# Patient Record
Sex: FEMALE | Race: BLACK OR AFRICAN AMERICAN | NOT HISPANIC OR LATINO | URBAN - METROPOLITAN AREA
[De-identification: names, ages, dates, MRNs, and addresses within clinical notes are randomized per-mention and may not be internally consistent; named-entity substitution may affect disease eponyms.]

---

## 2020-03-03 ENCOUNTER — EMERGENCY (EMERGENCY)
Facility: HOSPITAL | Age: 20
LOS: 1 days | Discharge: HOME | End: 2020-03-03
Attending: EMERGENCY MEDICINE | Admitting: EMERGENCY MEDICINE
Payer: MEDICAID

## 2020-03-03 VITALS
OXYGEN SATURATION: 100 % | DIASTOLIC BLOOD PRESSURE: 61 MMHG | TEMPERATURE: 99 F | SYSTOLIC BLOOD PRESSURE: 109 MMHG | HEART RATE: 84 BPM | RESPIRATION RATE: 16 BRPM

## 2020-03-03 VITALS
RESPIRATION RATE: 18 BRPM | TEMPERATURE: 98 F | OXYGEN SATURATION: 100 % | HEART RATE: 80 BPM | DIASTOLIC BLOOD PRESSURE: 78 MMHG | SYSTOLIC BLOOD PRESSURE: 113 MMHG

## 2020-03-03 LAB
ALBUMIN SERPL ELPH-MCNC: 4.5 G/DL — SIGNIFICANT CHANGE UP (ref 3.3–5)
ALP SERPL-CCNC: 50 U/L — SIGNIFICANT CHANGE UP (ref 40–120)
ALT FLD-CCNC: 30 U/L — SIGNIFICANT CHANGE UP (ref 4–33)
ANION GAP SERPL CALC-SCNC: 16 MMO/L — HIGH (ref 7–14)
APPEARANCE UR: CLEAR — SIGNIFICANT CHANGE UP
AST SERPL-CCNC: 36 U/L — HIGH (ref 4–32)
BACTERIA # UR AUTO: SIGNIFICANT CHANGE UP
BASOPHILS # BLD AUTO: 0.02 K/UL — SIGNIFICANT CHANGE UP (ref 0–0.2)
BASOPHILS NFR BLD AUTO: 0.2 % — SIGNIFICANT CHANGE UP (ref 0–2)
BILIRUB SERPL-MCNC: 0.5 MG/DL — SIGNIFICANT CHANGE UP (ref 0.2–1.2)
BILIRUB UR-MCNC: NEGATIVE — SIGNIFICANT CHANGE UP
BLOOD UR QL VISUAL: SIGNIFICANT CHANGE UP
BUN SERPL-MCNC: 9 MG/DL — SIGNIFICANT CHANGE UP (ref 7–23)
CALCIUM SERPL-MCNC: 9.1 MG/DL — SIGNIFICANT CHANGE UP (ref 8.4–10.5)
CHLORIDE SERPL-SCNC: 95 MMOL/L — LOW (ref 98–107)
CO2 SERPL-SCNC: 22 MMOL/L — SIGNIFICANT CHANGE UP (ref 22–31)
COLOR SPEC: YELLOW — SIGNIFICANT CHANGE UP
CREAT SERPL-MCNC: 0.65 MG/DL — SIGNIFICANT CHANGE UP (ref 0.5–1.3)
EOSINOPHIL # BLD AUTO: 0.01 K/UL — SIGNIFICANT CHANGE UP (ref 0–0.5)
EOSINOPHIL NFR BLD AUTO: 0.1 % — SIGNIFICANT CHANGE UP (ref 0–6)
GLUCOSE SERPL-MCNC: 90 MG/DL — SIGNIFICANT CHANGE UP (ref 70–99)
GLUCOSE UR-MCNC: NEGATIVE — SIGNIFICANT CHANGE UP
HCT VFR BLD CALC: 36.5 % — SIGNIFICANT CHANGE UP (ref 34.5–45)
HGB BLD-MCNC: 12.4 G/DL — SIGNIFICANT CHANGE UP (ref 11.5–15.5)
HYALINE CASTS # UR AUTO: HIGH
IMM GRANULOCYTES NFR BLD AUTO: 0.2 % — SIGNIFICANT CHANGE UP (ref 0–1.5)
KETONES UR-MCNC: >150 — HIGH
LEUKOCYTE ESTERASE UR-ACNC: NEGATIVE — SIGNIFICANT CHANGE UP
LYMPHOCYTES # BLD AUTO: 1.78 K/UL — SIGNIFICANT CHANGE UP (ref 1–3.3)
LYMPHOCYTES # BLD AUTO: 19.3 % — SIGNIFICANT CHANGE UP (ref 13–44)
MAGNESIUM SERPL-MCNC: 2.1 MG/DL — SIGNIFICANT CHANGE UP (ref 1.6–2.6)
MCHC RBC-ENTMCNC: 30.8 PG — SIGNIFICANT CHANGE UP (ref 27–34)
MCHC RBC-ENTMCNC: 34 % — SIGNIFICANT CHANGE UP (ref 32–36)
MCV RBC AUTO: 90.8 FL — SIGNIFICANT CHANGE UP (ref 80–100)
MONOCYTES # BLD AUTO: 0.63 K/UL — SIGNIFICANT CHANGE UP (ref 0–0.9)
MONOCYTES NFR BLD AUTO: 6.8 % — SIGNIFICANT CHANGE UP (ref 2–14)
NEUTROPHILS # BLD AUTO: 6.74 K/UL — SIGNIFICANT CHANGE UP (ref 1.8–7.4)
NEUTROPHILS NFR BLD AUTO: 73.4 % — SIGNIFICANT CHANGE UP (ref 43–77)
NITRITE UR-MCNC: NEGATIVE — SIGNIFICANT CHANGE UP
NRBC # FLD: 0 K/UL — SIGNIFICANT CHANGE UP (ref 0–0)
PH UR: 6 — SIGNIFICANT CHANGE UP (ref 5–8)
PHOSPHATE SERPL-MCNC: 4.1 MG/DL — SIGNIFICANT CHANGE UP (ref 2.5–4.5)
PLATELET # BLD AUTO: 315 K/UL — SIGNIFICANT CHANGE UP (ref 150–400)
PMV BLD: 10.4 FL — SIGNIFICANT CHANGE UP (ref 7–13)
POTASSIUM SERPL-MCNC: 3.6 MMOL/L — SIGNIFICANT CHANGE UP (ref 3.5–5.3)
POTASSIUM SERPL-SCNC: 3.6 MMOL/L — SIGNIFICANT CHANGE UP (ref 3.5–5.3)
PROT SERPL-MCNC: 8.5 G/DL — HIGH (ref 6–8.3)
PROT UR-MCNC: 200 — HIGH
RBC # BLD: 4.02 M/UL — SIGNIFICANT CHANGE UP (ref 3.8–5.2)
RBC # FLD: 11.9 % — SIGNIFICANT CHANGE UP (ref 10.3–14.5)
RBC CASTS # UR COMP ASSIST: SIGNIFICANT CHANGE UP (ref 0–?)
SODIUM SERPL-SCNC: 133 MMOL/L — LOW (ref 135–145)
SP GR SPEC: 1.04 — SIGNIFICANT CHANGE UP (ref 1–1.04)
SPECIMEN SOURCE: SIGNIFICANT CHANGE UP
SQUAMOUS # UR AUTO: SIGNIFICANT CHANGE UP
UROBILINOGEN FLD QL: SIGNIFICANT CHANGE UP
WBC # BLD: 9.2 K/UL — SIGNIFICANT CHANGE UP (ref 3.8–10.5)
WBC # FLD AUTO: 9.2 K/UL — SIGNIFICANT CHANGE UP (ref 3.8–10.5)
WBC UR QL: HIGH (ref 0–?)

## 2020-03-03 PROCEDURE — 76705 ECHO EXAM OF ABDOMEN: CPT | Mod: 26

## 2020-03-03 PROCEDURE — 99284 EMERGENCY DEPT VISIT MOD MDM: CPT

## 2020-03-03 RX ORDER — FAMOTIDINE 10 MG/ML
20 INJECTION INTRAVENOUS ONCE
Refills: 0 | Status: COMPLETED | OUTPATIENT
Start: 2020-03-03 | End: 2020-03-03

## 2020-03-03 RX ORDER — ONDANSETRON 8 MG/1
1 TABLET, FILM COATED ORAL
Qty: 9 | Refills: 0
Start: 2020-03-03 | End: 2020-03-05

## 2020-03-03 RX ORDER — SODIUM CHLORIDE 9 MG/ML
1000 INJECTION INTRAMUSCULAR; INTRAVENOUS; SUBCUTANEOUS ONCE
Refills: 0 | Status: COMPLETED | OUTPATIENT
Start: 2020-03-03 | End: 2020-03-03

## 2020-03-03 RX ORDER — ONDANSETRON 8 MG/1
4 TABLET, FILM COATED ORAL ONCE
Refills: 0 | Status: COMPLETED | OUTPATIENT
Start: 2020-03-03 | End: 2020-03-03

## 2020-03-03 RX ADMIN — ONDANSETRON 4 MILLIGRAM(S): 8 TABLET, FILM COATED ORAL at 04:38

## 2020-03-03 RX ADMIN — Medication 30 MILLILITER(S): at 04:45

## 2020-03-03 RX ADMIN — FAMOTIDINE 20 MILLIGRAM(S): 10 INJECTION INTRAVENOUS at 04:45

## 2020-03-03 RX ADMIN — SODIUM CHLORIDE 1000 MILLILITER(S): 9 INJECTION INTRAMUSCULAR; INTRAVENOUS; SUBCUTANEOUS at 04:38

## 2020-03-03 NOTE — ED PROVIDER NOTE - PATIENT PORTAL LINK FT
You can access the FollowMyHealth Patient Portal offered by [facility] by registering at the following website: [email or web address]. By joining [facility]’s FollowMyHealth portal, you will also be able to view your health information using other applications (apps) compatible with our system.

## 2020-03-03 NOTE — ED PROVIDER NOTE - ATTENDING CONTRIBUTION TO CARE
19F G1 EGA 11wk (confirmed IUP at her OB, hx low thyroid now p/w 5d worsening n/v w/ nonradiating atraumatic epigastric pain - pt unsure which caused which.  +5lb weight loss over past week. ROS +cough. No f/c, CP, SOB, dysuria, constipation. No vaginal bleeding or leakage of fluid.     VS: afebrile, others reassuring   Gen: Well appearing in NAD  Head: NC/AT  HEENT: dry mucous membranes   Neck: trachea midline  Resp:  No distress  abd: soft, +epigastric & mild RUQ ttp w/o guarding   Ext: no deformities  Neuro:  A&Ox3  Skin:  Warm and dry as visualized  Psych:  appropriate affect and mood    MDM: likely hyperenmesis but could be c/w cholelethiasis/cholecystitis   Plan: 1) IV access/IVF/LABS/UA/US RUQ.  2) Pain control as needed/anti-emetics as needed.  3) confirm FHR.  4) Surgical consultation if necessary

## 2020-03-03 NOTE — ED ADULT NURSE NOTE - CHIEF COMPLAINT QUOTE
Patient reports about 11 weeks pregnant c/o unable to tolerate PO intake x1 week. Patient reports abdominal burning and 5lb weight loss within 1 week. Patient denies any vaginal bleeding or cramping. LMP 12/12.

## 2020-03-03 NOTE — ED PROVIDER NOTE - NS ED ROS FT
General: Denies fevers or chills  Eyes: Denies vision changes  ENMT: Denies trouble swallowing or speaking, or sore throat  CV: Denies chest pain or palpitations  Resp: Denies cough or SOB  GI: +Abdominal pain, nausea, vomiting. No diarrhea, or constipation   : +Intermittent pelvic cramps. Denies painful urination, increased urinary frequency, or blood in urine  Skin: Denies new rashes  Neuro: Denies headache, numbness, or weakness  MSK: Denies recent trauma

## 2020-03-03 NOTE — ED PROVIDER NOTE - OBJECTIVE STATEMENT
19F 11wk pregnant confirmed IUP PMH hypothyroidism p/w burning epigastric pain with n/v x 5 days. +5lb weight loss over past week. OB Dr. Kaur Abraham planned for IV hydration/antiemetics tomorrow but pt's sx have been worsening. Associated with generalized weakness, lightheadedness. Also reports productive cough. No f/c, CP, SOB, dysuria, constipation. +A few episodes of diarrhea. No vaginal bleeding or leakage of fluid. No change in her baseline occasional pelvic cramps, which have been evaluated by her OB.

## 2020-03-03 NOTE — ED PROVIDER NOTE - PHYSICAL EXAMINATION
I have reviewed the triage vital signs.  Const: AAOx3, in NAD  Eyes: no conjunctival injection  HENT: NCAT, Neck supple, oral mucosa moist  CV: RRR, +S1, S2  Resp: CTAB, no respiratory distress  GI: Abdomen soft, nondistended, +epigastric/RUQ TTP, no guarding, no CVA tenderness  Extremities: No peripheral edema,  2+ radial and DP pulses  Skin: Warm, well perfused, no rash  MSK: No gross deformities appreciated  Neuro: No focal sensory or motor deficits  Psych: Appropriate mood and affect

## 2020-03-03 NOTE — ED PROVIDER NOTE - PROGRESS NOTE DETAILS
Zvi Dubin, MD note: Pt seen & reassessed.  Pt symptomatically improved.  Emergency Department FRH = 176.  A&Ox3, NAD, VSS, pt tolerated PO liquids & ambulated w/steady unassisted gait in the ED.  We discussed the results of ED w/u w/patient (incl. presumptive Emergency Department dx, associated anticipatory guidance, stressing importance of prompt f/u, return precautions), & gave them a copy of results.  Patient verbalized understanding of ED course & agreed with our f/u recommendations, has decisional making capacity.  Pt st they will f/u w/PMD within the next 3 days; pt agrees to call today or tomorrow for an appointment. Pt agrees to return to the ED if there is any worsening or concerning symptoms.

## 2020-03-03 NOTE — ED PROVIDER NOTE - CLINICAL SUMMARY MEDICAL DECISION MAKING FREE TEXT BOX
Robbi, PGY1 - 19F 11wk pregnant confirmed IUP p/w epigastric pain with n/v x 5 days. Likely hyperemesis gravidarum. Will evaluate for gallbladder pathology given +RUQ TTP. Plan: labs, ivf, zofran.

## 2020-03-03 NOTE — ED ADULT NURSE NOTE - OBJECTIVE STATEMENT
Received Pt in room 24. pt A&OX3, ambulatory, mother at bedside. Patient reports about 11 weeks pregnant c/o unable to tolerate PO intake x1 week. Patient reports abdominal burning and 5lb weight loss within 1 week. Pt also reports intermittent abdominal pain. LMP 12/12. Pt appears stable and in no apparent distress at this time. Vitals stable as noted. Respirations are equal and nonlabored, no respiratory distress noted. sating 100% on room air. Abdomen soft, tender to touch in upper quadrants. Denies any vaginal bleeding or cramping, chest pain, sob, fever/chills, cough, dizziness, headache, urinary complications. 20 gauge iv placed in the right ac, labs sent. pt medicated as per orders. iv fluids infusing. Pt stable, awaiting ultrasound

## 2020-03-03 NOTE — ED PROVIDER NOTE - NSFOLLOWUPINSTRUCTIONS_ED_ALL_ED_FT
-- We have sent a prescription for zofran directly to the pharmacy you specified.  Please pick it up as soon as possible and take as directed.  We advise to refrain from consuming alcohol or grapefruit juice while taking any medication until checking with the doctor or pharmacist.   -- Please follow up with your OBGYN within the next 3 days, but seek medical care sooner if your symptoms worsen. Call for an appointment as soon as possible.  -- Make sure to stay hydrated. This can be most easily done by drinking frequent small amounts of gatorade or watered down apple/orange juice. It's OK if you do not eat anything for a few days as long as you stay hydrated.  -- Please eat a bland diet (toast, jam, tea, bananas, plain rice, applesauce, etc.) until your symptoms have resolved. Avoid fatty/greasy/spicy foods, alcohol or anything that bothers your stomach.   -- You were given results from any tests performed in the Emergency Department which have results available. Show these to your doctor(s). Some of the tests we sent may not have results yet so please call or have your doctor call the Emergency Department to follow up on all results.  -- If you have worsening or concerning symptoms, see your doctor right away or return to the Emergency Department immediately.  -- Please continue taking your home medications as directed. Do not take extra doses of medication to make up for missed doses. Don't use alcohol when taking any medication (especially antibiotics, Tylenol or pain medication) unless you check with a doctor/pharmacist.

## 2020-03-04 LAB — BACTERIA UR CULT: SIGNIFICANT CHANGE UP

## 2020-03-07 NOTE — ED POST DISCHARGE NOTE - RESULT SUMMARY
UCX: Culture grew 3 or more types of organisms which indicate collection contamination; consider recollection only if clinically indicated. No antibiotic noted. Pt is 11 weeks pregnant

## 2020-07-30 ENCOUNTER — OUTPATIENT (OUTPATIENT)
Dept: INPATIENT UNIT | Facility: HOSPITAL | Age: 20
LOS: 1 days | Discharge: HOME | End: 2020-07-30
Payer: MEDICAID

## 2020-07-30 VITALS — TEMPERATURE: 98 F

## 2020-07-30 DIAGNOSIS — Z3A.00 WEEKS OF GESTATION OF PREGNANCY NOT SPECIFIED: ICD-10-CM

## 2020-07-30 DIAGNOSIS — O26.899 OTHER SPECIFIED PREGNANCY RELATED CONDITIONS, UNSPECIFIED TRIMESTER: ICD-10-CM

## 2020-07-30 PROCEDURE — 99203 OFFICE O/P NEW LOW 30 MIN: CPT

## 2020-07-30 PROCEDURE — 76818 FETAL BIOPHYS PROFILE W/NST: CPT | Mod: 26

## 2020-07-30 RX ORDER — LEVOTHYROXINE SODIUM 125 MCG
1 TABLET ORAL
Qty: 0 | Refills: 0 | DISCHARGE

## 2020-07-30 RX ORDER — ACETAMINOPHEN 500 MG
975 TABLET ORAL ONCE
Refills: 0 | Status: COMPLETED | OUTPATIENT
Start: 2020-07-30 | End: 2020-07-30

## 2020-07-30 RX ADMIN — Medication 975 MILLIGRAM(S): at 23:29

## 2020-07-30 NOTE — OB RN TRIAGE NOTE - CHIEF COMPLAINT QUOTE
Pt. was referred from the doctor's office to r/o PEC due to swelling of the lower extremities , headache and blurry vision .

## 2020-07-31 VITALS — SYSTOLIC BLOOD PRESSURE: 85 MMHG | DIASTOLIC BLOOD PRESSURE: 47 MMHG | HEART RATE: 70 BPM

## 2020-07-31 PROBLEM — E03.9 HYPOTHYROIDISM, UNSPECIFIED: Chronic | Status: ACTIVE | Noted: 2020-03-03

## 2020-07-31 LAB
ALBUMIN SERPL ELPH-MCNC: 3.3 G/DL — SIGNIFICANT CHANGE UP (ref 3.3–5)
ALP SERPL-CCNC: 90 U/L — SIGNIFICANT CHANGE UP (ref 40–120)
ALT FLD-CCNC: 12 U/L — SIGNIFICANT CHANGE UP (ref 4–33)
ANION GAP SERPL CALC-SCNC: 12 MMO/L — SIGNIFICANT CHANGE UP (ref 7–14)
APPEARANCE UR: CLEAR — SIGNIFICANT CHANGE UP
APTT BLD: 28.4 SEC — SIGNIFICANT CHANGE UP (ref 27–36.3)
AST SERPL-CCNC: 17 U/L — SIGNIFICANT CHANGE UP (ref 4–32)
BASOPHILS # BLD AUTO: 0.03 K/UL — SIGNIFICANT CHANGE UP (ref 0–0.2)
BASOPHILS NFR BLD AUTO: 0.3 % — SIGNIFICANT CHANGE UP (ref 0–2)
BILIRUB SERPL-MCNC: < 0.2 MG/DL — LOW (ref 0.2–1.2)
BILIRUB UR-MCNC: NEGATIVE — SIGNIFICANT CHANGE UP
BLOOD UR QL VISUAL: NEGATIVE — SIGNIFICANT CHANGE UP
BUN SERPL-MCNC: 10 MG/DL — SIGNIFICANT CHANGE UP (ref 7–23)
CALCIUM SERPL-MCNC: 8.8 MG/DL — SIGNIFICANT CHANGE UP (ref 8.4–10.5)
CHLORIDE SERPL-SCNC: 102 MMOL/L — SIGNIFICANT CHANGE UP (ref 98–107)
CO2 SERPL-SCNC: 20 MMOL/L — LOW (ref 22–31)
COLOR SPEC: YELLOW — SIGNIFICANT CHANGE UP
CREAT ?TM UR-MCNC: 239.1 MG/DL — SIGNIFICANT CHANGE UP
CREAT SERPL-MCNC: 0.57 MG/DL — SIGNIFICANT CHANGE UP (ref 0.5–1.3)
EOSINOPHIL # BLD AUTO: 0.16 K/UL — SIGNIFICANT CHANGE UP (ref 0–0.5)
EOSINOPHIL NFR BLD AUTO: 1.4 % — SIGNIFICANT CHANGE UP (ref 0–6)
FIBRINOGEN PPP-MCNC: 535 MG/DL — HIGH (ref 300–520)
GLUCOSE SERPL-MCNC: 111 MG/DL — HIGH (ref 70–99)
GLUCOSE UR-MCNC: NEGATIVE — SIGNIFICANT CHANGE UP
HCT VFR BLD CALC: 31 % — LOW (ref 34.5–45)
HGB BLD-MCNC: 10.1 G/DL — LOW (ref 11.5–15.5)
IMM GRANULOCYTES NFR BLD AUTO: 1.8 % — HIGH (ref 0–1.5)
INR BLD: 1.01 — SIGNIFICANT CHANGE UP (ref 0.88–1.17)
KETONES UR-MCNC: NEGATIVE — SIGNIFICANT CHANGE UP
LDH SERPL L TO P-CCNC: 192 U/L — SIGNIFICANT CHANGE UP (ref 135–225)
LEUKOCYTE ESTERASE UR-ACNC: NEGATIVE — SIGNIFICANT CHANGE UP
LYMPHOCYTES # BLD AUTO: 17.5 % — SIGNIFICANT CHANGE UP (ref 13–44)
LYMPHOCYTES # BLD AUTO: 2.03 K/UL — SIGNIFICANT CHANGE UP (ref 1–3.3)
MCHC RBC-ENTMCNC: 30.6 PG — SIGNIFICANT CHANGE UP (ref 27–34)
MCHC RBC-ENTMCNC: 32.6 % — SIGNIFICANT CHANGE UP (ref 32–36)
MCV RBC AUTO: 93.9 FL — SIGNIFICANT CHANGE UP (ref 80–100)
MONOCYTES # BLD AUTO: 0.85 K/UL — SIGNIFICANT CHANGE UP (ref 0–0.9)
MONOCYTES NFR BLD AUTO: 7.3 % — SIGNIFICANT CHANGE UP (ref 2–14)
NEUTROPHILS # BLD AUTO: 8.3 K/UL — HIGH (ref 1.8–7.4)
NEUTROPHILS NFR BLD AUTO: 71.7 % — SIGNIFICANT CHANGE UP (ref 43–77)
NITRITE UR-MCNC: NEGATIVE — SIGNIFICANT CHANGE UP
NRBC # FLD: 0 K/UL — SIGNIFICANT CHANGE UP (ref 0–0)
PH UR: 6.5 — SIGNIFICANT CHANGE UP (ref 5–8)
PLATELET # BLD AUTO: 223 K/UL — SIGNIFICANT CHANGE UP (ref 150–400)
PMV BLD: 10.8 FL — SIGNIFICANT CHANGE UP (ref 7–13)
POTASSIUM SERPL-MCNC: 3.4 MMOL/L — LOW (ref 3.5–5.3)
POTASSIUM SERPL-SCNC: 3.4 MMOL/L — LOW (ref 3.5–5.3)
PROT SERPL-MCNC: 6.4 G/DL — SIGNIFICANT CHANGE UP (ref 6–8.3)
PROT UR-MCNC: 26.4 MG/DL — SIGNIFICANT CHANGE UP
PROT UR-MCNC: 50 — SIGNIFICANT CHANGE UP
PROTHROM AB SERPL-ACNC: 11.5 SEC — SIGNIFICANT CHANGE UP (ref 9.8–13.1)
RBC # BLD: 3.3 M/UL — LOW (ref 3.8–5.2)
RBC # FLD: 12.6 % — SIGNIFICANT CHANGE UP (ref 10.3–14.5)
RBC CASTS # UR COMP ASSIST: SIGNIFICANT CHANGE UP (ref 0–?)
SODIUM SERPL-SCNC: 134 MMOL/L — LOW (ref 135–145)
SP GR SPEC: 1.03 — SIGNIFICANT CHANGE UP (ref 1–1.04)
URATE SERPL-MCNC: 4.3 MG/DL — SIGNIFICANT CHANGE UP (ref 2.5–7)
UROBILINOGEN FLD QL: NORMAL — SIGNIFICANT CHANGE UP
WBC # BLD: 11.58 K/UL — HIGH (ref 3.8–10.5)
WBC # FLD AUTO: 11.58 K/UL — HIGH (ref 3.8–10.5)
WBC UR QL: SIGNIFICANT CHANGE UP (ref 0–?)

## 2020-07-31 NOTE — OB PROVIDER TRIAGE NOTE - ADDITIONAL INSTRUCTIONS
-s/s of pre-eclampsia reviewed  -PTL precautions reviewed  -Kick counts reviewed  -Patient instructed to follow up with next scheduled appointment  -Verbal and written discharge instructions given

## 2020-07-31 NOTE — OB PROVIDER TRIAGE NOTE - HISTORY OF PRESENT ILLNESS
Patient of [facility] 18 y/o  @33 weeks gestation presents to triage with complaints of bilateral leg swelling, intermitten blurry vision in the right eye and headache relieved with Tylenol. Patient states has been noted bilateral swelling for the last 2-3 weeks which resolves on its on but states on  she also noted facial and vaginal swelling in adittion to bilateral leg swellin. Patient states she took 100mg of tylenol for her headache which provides her relief but her headache returns. Patient states she was seen in [facility] yesterday as advised by MD TO R/O PEC. Patient states she did not stay for lab work Patient of [facility] 20 y/o  @33 weeks gestation presents to triage with complaints of bilateral leg swelling, intermittent blurry vision in the right eye and headache relieved with Tylenol. Patient states has been noted bilateral leg swelling for the last 2-3 weeks which resolves on its own but states on  she also noted facial and vaginal swelling in addition to bilateral leg swelling. Patient states she took 1000mg of Tylenol for her headache which provided her relief but states her headache returns. Patient states she was seen in [facility] yesterday as advised by MD to r/o PEC. Patient states she did not stay for results of lab work.   Medical H/x: Hypothyroidism   Surgical H/x: Denies  Ob/Gyn H/x: Denies  Psych H/x: Denies  Meds: Synthroid 0.75mcg, pnv  Allergies: NKDA Patient of [facility] 18 y/o  @33 weeks gestation presents to triage with complaints of bilateral leg swelling, intermittent blurry vision in the right eye and a headache relieved with Tylenol. Patient states she has been experiencing bilateral leg swelling for 2-3 weeks which resolves on its own but states on  she also noted facial and vaginal swelling in addition to bilateral leg swelling. Patient states she took 1000mg of Tylenol for her headache which provided her with relief but states her headache returned. Patient states she was seen in [facility] yesterday as advised by MD to r/o PEC. Patient states she did not stay for results of lab work. Patient denies contractions, leaking of fluid, vaginal bleeding and reports positive fetal movement.   Medical H/x: Hypothyroidism   Surgical H/x: Denies  Ob/Gyn H/x: Denies  Psych H/x: Denies  Meds: Synthroid 0.75mcg, PNV  Allergies: NKDA

## 2020-07-31 NOTE — OB PROVIDER TRIAGE NOTE - NSOBPROVIDERNOTE_OBGYN_ALL_OB_FT
No evidence of PEC. Discussed with Dr. Stuart  -s/s of Pre-eclampsia reviewed  -PTL precautions reviewed  -Kick counts reviewed  -Patient instructed to follow up with next scheduled appointment 8/15/2020  -Verbal and written discharge instructions given, patient verbalized understanding Bp: 98/57, 94/52, 91/53, 92/57, 84/50, 82/44, 85/46, 103/54, 94/51, 85/47    No evidence of PEC. Discussed with Dr. Stuart:  -S/s of Pre-eclampsia reviewed  -PTL precautions reviewed  -Kick counts reviewed  -Patient instructed to follow up with next scheduled appointment 8/15/2020  -Verbal and written discharge instructions given, patient verbalized understanding

## 2020-07-31 NOTE — OB PROVIDER TRIAGE NOTE - NSHPPHYSICALEXAM_GEN_ALL_CORE
Bp: 102/54  T: 36.8  HR: 76  Abdomen: Gravid, soft, non-tender on palpation  TAUS: Cephalic presentation, Anterior placenta, Bpp:8/8, ADRIAN :14.52  NST:  with moderate variability, 15x15 accelerations, no decelerations   [place]: No contractions

## 2020-07-31 NOTE — OB PROVIDER TRIAGE NOTE - NSHPLABSRESULTS_GEN_ALL_CORE
CBC Full  -  ( 2020 23:20 )  WBC Count : 11.58 K/uL  RBC Count : 3.30 M/uL  Hemoglobin : 10.1 g/dL  Hematocrit : 31.0 %  Platelet Count - Automated : 223 K/uL  Mean Cell Volume : 93.9 fL  Mean Cell Hemoglobin : 30.6 pg  Mean Cell Hemoglobin Concentration : 32.6 %  Auto Neutrophil # : 8.30 K/uL  Auto Lymphocyte # : 2.03 K/uL  Auto Monocyte # : 0.85 K/uL  Auto Eosinophil # : 0.16 K/uL  Auto Basophil # : 0.03 K/uL  Auto Neutrophil % : 71.7 %  Auto Lymphocyte % : 17.5 %  Auto Monocyte % : 7.3 %  Auto Eosinophil % : 1.4 %  Auto Basophil % : 0.3 %        134<L>  |  102  |  10  ----------------------------<  111<H>  3.4<L>   |  20<L>  |  0.57    Ca    8.8      2020 23:20    TPro  6.4  /  Alb  3.3  /  TBili  < 0.2<L>  /  DBili  x   /  AST  17  /  ALT  12  /  AlkPhos  90  07-30    Urinalysis Basic - ( 2020 23:20 )    Color: YELLOW / Appearance: CLEAR / S.029 / pH: 6.5  Gluc: NEGATIVE / Ketone: NEGATIVE  / Bili: NEGATIVE / Urobili: NORMAL   Blood: NEGATIVE / Protein: 50 / Nitrite: NEGATIVE   Leuk Esterase: NEGATIVE / RBC: 0-2 / WBC 0-2   Sq Epi: x / Non Sq Epi: x / Bacteria: x    Uric Acid: 4.3  Lactate Dehydrogenase: 192  PCR:0.11  PT: 11.5  INR: 1.01  PTT: 28.4  Fibrinogen Assay: 535.0

## 2023-10-06 NOTE — ED ADULT NURSE NOTE - NSSUHOSCREENINGYN_ED_ALL_ED
Yes - the patient is able to be screened Isotretinoin Counseling: Patient should get monthly blood tests, not donate blood, not drive at night if vision affected, not share medication, and not undergo elective surgery for 6 months after tx completed. Side effects reviewed, pt to contact office should one occur.

## 2024-07-30 ENCOUNTER — APPOINTMENT (OUTPATIENT)
Age: 24
End: 2024-07-30

## 2024-09-12 ENCOUNTER — APPOINTMENT (OUTPATIENT)
Age: 24
End: 2024-09-12